# Patient Record
(demographics unavailable — no encounter records)

---

## 2025-02-21 NOTE — PHYSICAL EXAM
[No Acute Distress] : no acute distress [Well Nourished] : well nourished [Well Developed] : well developed [Well-Appearing] : well-appearing [Normal] : normal gait, coordination grossly intact, no focal deficits and deep tendon reflexes were 2+ and symmetric [Normal Affect] : the affect was normal [Alert and Oriented x3] : oriented to person, place, and time [Normal Insight/Judgement] : insight and judgment were intact

## 2025-02-23 NOTE — HISTORY OF PRESENT ILLNESS
[FreeTextEntry1] :  I need a refill on my medications [de-identified] :  Mr presents today with no distress or complaints informing that he is in need of refills for h medication.

## 2025-02-23 NOTE — HISTORY OF PRESENT ILLNESS
[FreeTextEntry1] :  I need a refill on my medications [de-identified] :  Mr presents today with no distress or complaints informing that he is in need of refills for h medication.

## 2025-02-23 NOTE — HISTORY OF PRESENT ILLNESS
[FreeTextEntry1] :  I need a refill on my medications [de-identified] :  Mr presents today with no distress or complaints informing that he is in need of refills for h medication.

## 2025-06-06 NOTE — HISTORY OF PRESENT ILLNESS
[Lower back] : lower back [5] : 5 [Frequent] : frequent [Sleep] : sleep [Meds] : meds [Heat] : heat [Full time] : Work status: full time [] : no [de-identified] : TOO MUCH HEAVY ACTIVITY [de-identified] : MANY YEARS AGO